# Patient Record
Sex: MALE | Race: WHITE | NOT HISPANIC OR LATINO
[De-identification: names, ages, dates, MRNs, and addresses within clinical notes are randomized per-mention and may not be internally consistent; named-entity substitution may affect disease eponyms.]

---

## 2021-03-17 PROBLEM — Z00.00 ENCOUNTER FOR PREVENTIVE HEALTH EXAMINATION: Status: ACTIVE | Noted: 2021-03-17

## 2021-03-26 PROBLEM — Z82.49 FAMILY HISTORY OF HYPERTENSION: Status: ACTIVE | Noted: 2021-03-26

## 2021-03-26 PROBLEM — Z80.0 FAMILY HISTORY OF MALIGNANT NEOPLASM OF INTESTINE: Status: ACTIVE | Noted: 2021-03-26

## 2021-03-26 PROBLEM — Z80.1 FAMILY HISTORY OF LUNG CANCER: Status: ACTIVE | Noted: 2021-03-26

## 2021-03-26 PROBLEM — Z78.9 CONSUMES ALCOHOL OCCASIONALLY: Status: ACTIVE | Noted: 2021-03-26

## 2021-03-26 PROBLEM — Z80.3 FAMILY HISTORY OF MALIGNANT NEOPLASM OF BREAST: Status: ACTIVE | Noted: 2021-03-26

## 2021-03-26 RX ORDER — FINASTERIDE 1 MG/1
TABLET ORAL
Refills: 0 | Status: ACTIVE | COMMUNITY

## 2021-03-29 ENCOUNTER — APPOINTMENT (OUTPATIENT)
Dept: COLORECTAL SURGERY | Facility: CLINIC | Age: 36
End: 2021-03-29
Payer: COMMERCIAL

## 2021-03-29 VITALS
BODY MASS INDEX: 27.17 KG/M2 | OXYGEN SATURATION: 98 % | WEIGHT: 205 LBS | HEIGHT: 73 IN | HEART RATE: 67 BPM | TEMPERATURE: 97.6 F | SYSTOLIC BLOOD PRESSURE: 110 MMHG | DIASTOLIC BLOOD PRESSURE: 71 MMHG

## 2021-03-29 DIAGNOSIS — Z80.0 FAMILY HISTORY OF MALIGNANT NEOPLASM OF DIGESTIVE ORGANS: ICD-10-CM

## 2021-03-29 DIAGNOSIS — L29.0 PRURITUS ANI: ICD-10-CM

## 2021-03-29 DIAGNOSIS — K62.89 OTHER SPECIFIED DISEASES OF ANUS AND RECTUM: ICD-10-CM

## 2021-03-29 DIAGNOSIS — Z78.9 OTHER SPECIFIED HEALTH STATUS: ICD-10-CM

## 2021-03-29 DIAGNOSIS — Z80.1 FAMILY HISTORY OF MALIGNANT NEOPLASM OF TRACHEA, BRONCHUS AND LUNG: ICD-10-CM

## 2021-03-29 DIAGNOSIS — Z80.3 FAMILY HISTORY OF MALIGNANT NEOPLASM OF BREAST: ICD-10-CM

## 2021-03-29 DIAGNOSIS — Z82.49 FAMILY HISTORY OF ISCHEMIC HEART DISEASE AND OTHER DISEASES OF THE CIRCULATORY SYSTEM: ICD-10-CM

## 2021-03-29 PROCEDURE — 46600 DIAGNOSTIC ANOSCOPY SPX: CPT

## 2021-03-29 PROCEDURE — 99072 ADDL SUPL MATRL&STAF TM PHE: CPT

## 2021-03-29 PROCEDURE — 99203 OFFICE O/P NEW LOW 30 MIN: CPT | Mod: 25

## 2021-03-29 NOTE — HISTORY OF PRESENT ILLNESS
[FreeTextEntry1] : 34 yo M presents for evaluation of skin tag, referred by Dr. Mala Brink\par \par hx of hemorrhoids in his 20s associated w/ BRB in toilet bowl, which he attributed to his diet and low intake of water at the time.\par Patient has tried Prep H and witch hazel in the past and was seen by PCP who noted fissure and prescribed a topical at the time\par \par Over the years, patient has been eating healthier, increased water intake and reduced stress w/ improvement in hemorrhoid symptoms for the last 6 years\par \par He noticed "skin tag" approximately 1 year ago and reports it occasionally causes irritation and + scant BRB noted on TP a few times a month\par Denies itching or burning\par Applies Vasoline after showering\par \par Admits to being "neurotic" when it comes to cleaning after BMs\par Denies itching or scratching\par Has used bidet in past\par Admits toilet behaviors improved since changing diet, resulting in less aggressive wiping maneuvers. \par \par Also c/o rare instances of stabbing mid abdominal pain if he experiences urge to defecate, but has to ignore urge due to lack of access to toilet. Pain is immediately relieved w/ BM. Last occurred one week ago and prior episode 6 months ago. Denies association w/ nausea or vomiting\par \par BH: 2-3 times per day\par Denies constipation, diarrhea or straining \par Reports adequate dietary fiber and water intake, denies caffeine intake\par Denies fiber supplement or stool softener use\par Never had a colonoscopy\par FMH of colon cancer in Maternal grandmother, diagnosed in her 80s.  Mother recently diagnosed w/ Celiac's, Maternal Cousin w/ Crohn's disease. \par Aunt w/ Breast CA, GF and uncle w/ lung CA. \par Of note, patient recently did labs w/ PCP for Celiac's and reports otherwise normal\par Denies ASA use. Took Motrin in the last week for muscle strain

## 2021-03-29 NOTE — PHYSICAL EXAM
[FreeTextEntry1] : Medical assistant present for duration of physical examination\par \par General no acute distress, alert and oriented\par Psych calm, pleasant demeanor, responding appropriately to questions\par Nonlabored breathing\par Ambulating without assistance\par Skin focal perianal dermatitis with white plaques, red excoriations, chronic edema, and superficial fissures consistent with pruritus ani overlying tip of coccyx/posterior perianal skin\par \par Anorectal Exam:\par Inspection skin changes as above, no external skin tags\par AGRY nontender, no masses palpated, no blood on gloved finger\par \par Procedure: Anoscopy\par \par Pre procedure Diagnosis: pruritus\par Post procedure Diagnosis: pruritus, hypertrophied anal papilla\par Anesthesia: none\par Estimated blood loss: none\par Specimen: none\par Complications: none\par \par Consent obtained. Anoscopy was performed by passing a lighted anoscope with lubricant jelly into the anal canal and the entire anal mucosal surface was inspected. Findings included no fissure, mild internal hemorrhoid tissue, posterior midline hypertrophied anal papilla 1cm in length, 3mm in width\par \par Patient tolerated examination and procedure well.\par \par \par

## 2023-01-26 NOTE — ASSESSMENT
[FreeTextEntry1] : Exam findings discussed at length with patient. \par Hypertrophied anal papilla likely residual from prior anal fissure.\par Pruritus also noted of posterior perianal skin.\par \par Recommend avoid scratching, avoid over-cleaning. Discussed toilet behaviors. \par Discussed perianal hygiene and topical barrier agents.\par Sitz baths.\par Continue high fiber diet.\par Continue supportive care. \par F/u evaluation if symptoms persist or worsen\par All questions answered, patient expressed understanding and is agreeable to this plan.\par  No